# Patient Record
Sex: MALE | Race: WHITE
[De-identification: names, ages, dates, MRNs, and addresses within clinical notes are randomized per-mention and may not be internally consistent; named-entity substitution may affect disease eponyms.]

---

## 2021-08-16 ENCOUNTER — HOSPITAL ENCOUNTER (INPATIENT)
Dept: HOSPITAL 79 - ER1 | Age: 41
LOS: 4 days | Discharge: HOME | DRG: 438 | End: 2021-08-20
Attending: STUDENT IN AN ORGANIZED HEALTH CARE EDUCATION/TRAINING PROGRAM | Admitting: INTERNAL MEDICINE
Payer: COMMERCIAL

## 2021-08-16 VITALS — BODY MASS INDEX: 25.67 KG/M2 | HEIGHT: 74 IN | WEIGHT: 200 LBS

## 2021-08-16 DIAGNOSIS — E83.51: ICD-10-CM

## 2021-08-16 DIAGNOSIS — K86.1: ICD-10-CM

## 2021-08-16 DIAGNOSIS — K21.9: ICD-10-CM

## 2021-08-16 DIAGNOSIS — R74.01: ICD-10-CM

## 2021-08-16 DIAGNOSIS — E87.2: ICD-10-CM

## 2021-08-16 DIAGNOSIS — K85.90: Primary | ICD-10-CM

## 2021-08-16 DIAGNOSIS — E03.9: ICD-10-CM

## 2021-08-16 DIAGNOSIS — E83.42: ICD-10-CM

## 2021-08-16 DIAGNOSIS — E87.6: ICD-10-CM

## 2021-08-16 DIAGNOSIS — U07.1: ICD-10-CM

## 2021-08-16 LAB
BUN/CREATININE RATIO: 11 (ref 0–10)
BUN/CREATININE RATIO: 9 (ref 0–10)
HGB BLD-MCNC: 15.1 GM/DL (ref 14–17.5)
RED BLOOD COUNT: 4.42 M/UL (ref 4.2–5.5)
WHITE BLOOD COUNT: 10.8 K/UL (ref 4.5–11)

## 2021-08-16 PROCEDURE — G0378 HOSPITAL OBSERVATION PER HR: HCPCS

## 2021-08-16 PROCEDURE — U0002 COVID-19 LAB TEST NON-CDC: HCPCS

## 2021-08-16 PROCEDURE — C9113 INJ PANTOPRAZOLE SODIUM, VIA: HCPCS

## 2021-08-16 PROCEDURE — G0480 DRUG TEST DEF 1-7 CLASSES: HCPCS

## 2021-08-17 LAB
BUN/CREATININE RATIO: 9 (ref 0–10)
HGB BLD-MCNC: 13.5 GM/DL (ref 14–17.5)
RED BLOOD COUNT: 4.01 M/UL (ref 4.2–5.5)
WHITE BLOOD COUNT: 7.2 K/UL (ref 4.5–11)

## 2021-08-17 PROCEDURE — 8E0ZXY6 ISOLATION: ICD-10-PCS | Performed by: STUDENT IN AN ORGANIZED HEALTH CARE EDUCATION/TRAINING PROGRAM

## 2021-08-18 LAB
BUN/CREATININE RATIO: 8 (ref 0–10)
HGB BLD-MCNC: 13.2 GM/DL (ref 14–17.5)
RED BLOOD COUNT: 3.86 M/UL (ref 4.2–5.5)
WHITE BLOOD COUNT: 6.7 K/UL (ref 4.5–11)

## 2021-08-18 NOTE — NUR
CALLED DR. PENA CONCERNING [facility] IN [facility]101 CONCERNING PT. WANTING A
FLEXERIL FOR MUSCLE SPASMS.  THE DOCTOR ENTERED THE ORDER FOR A ONE TIME DOSE.
ALSO NOTIFIED DOCTOR THAT AN ALARM WAS HEARD WHEN PT. CALLED OUT FOR THE PAIN
MEDICATION.

## 2021-08-19 LAB — BUN/CREATININE RATIO: 5 (ref 0–10)

## 2021-08-20 LAB — BUN/CREATININE RATIO: 3 (ref 0–10)

## 2022-08-30 ENCOUNTER — HOSPITAL ENCOUNTER (INPATIENT)
Dept: HOSPITAL 79 - ER1 | Age: 42
LOS: 2 days | Discharge: HOME | DRG: 440 | End: 2022-09-01
Attending: INTERNAL MEDICINE | Admitting: INTERNAL MEDICINE
Payer: COMMERCIAL

## 2022-08-30 VITALS — BODY MASS INDEX: 23.1 KG/M2 | WEIGHT: 180 LBS | HEIGHT: 74 IN

## 2022-08-30 DIAGNOSIS — Z79.899: ICD-10-CM

## 2022-08-30 DIAGNOSIS — Z88.5: ICD-10-CM

## 2022-08-30 DIAGNOSIS — E87.6: ICD-10-CM

## 2022-08-30 DIAGNOSIS — K86.1: ICD-10-CM

## 2022-08-30 DIAGNOSIS — D72.829: ICD-10-CM

## 2022-08-30 DIAGNOSIS — K85.90: Primary | ICD-10-CM

## 2022-08-30 DIAGNOSIS — K21.9: ICD-10-CM

## 2022-08-30 LAB
BUN/CREATININE RATIO: 11 (ref 0–10)
HGB BLD-MCNC: 14.7 GM/DL (ref 14–17.5)
RED BLOOD COUNT: 4.25 M/UL (ref 4.2–5.5)
WHITE BLOOD COUNT: 16.8 K/UL (ref 4.5–11)

## 2022-08-30 PROCEDURE — C9113 INJ PANTOPRAZOLE SODIUM, VIA: HCPCS

## 2022-08-31 LAB
BUN/CREATININE RATIO: 12 (ref 0–10)
HGB BLD-MCNC: 12.3 GM/DL (ref 14–17.5)
RED BLOOD COUNT: 3.62 M/UL (ref 4.2–5.5)
WHITE BLOOD COUNT: 11.4 K/UL (ref 4.5–11)

## 2022-09-01 LAB
BUN/CREATININE RATIO: 9 (ref 0–10)
HGB BLD-MCNC: 12.4 GM/DL (ref 14–17.5)
RED BLOOD COUNT: 3.64 M/UL (ref 4.2–5.5)
WHITE BLOOD COUNT: 10.7 K/UL (ref 4.5–11)